# Patient Record
Sex: FEMALE | Race: BLACK OR AFRICAN AMERICAN | NOT HISPANIC OR LATINO | Employment: STUDENT | URBAN - METROPOLITAN AREA
[De-identification: names, ages, dates, MRNs, and addresses within clinical notes are randomized per-mention and may not be internally consistent; named-entity substitution may affect disease eponyms.]

---
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
## 2025-05-28 ENCOUNTER — APPOINTMENT (EMERGENCY)
Dept: RADIOLOGY | Facility: HOSPITAL | Age: 25
End: 2025-05-28
Payer: COMMERCIAL

## 2025-05-28 ENCOUNTER — HOSPITAL ENCOUNTER (EMERGENCY)
Facility: HOSPITAL | Age: 25
Discharge: HOME | End: 2025-05-28
Payer: COMMERCIAL

## 2025-05-28 VITALS
WEIGHT: 122 LBS | HEART RATE: 84 BPM | OXYGEN SATURATION: 100 % | DIASTOLIC BLOOD PRESSURE: 78 MMHG | TEMPERATURE: 98.8 F | SYSTOLIC BLOOD PRESSURE: 131 MMHG | RESPIRATION RATE: 16 BRPM

## 2025-05-28 DIAGNOSIS — S09.90XA HEAD INJURY: Primary | ICD-10-CM

## 2025-05-28 DIAGNOSIS — S16.1XXA STRAIN OF NECK MUSCLE, INITIAL ENCOUNTER: ICD-10-CM

## 2025-05-28 LAB
EXT PREGNANCY TEST URINE: NEGATIVE
EXT. CONTROL: NORMAL

## 2025-05-28 PROCEDURE — 72125 CT NECK SPINE W/O DYE: CPT

## 2025-05-28 PROCEDURE — 99284 EMERGENCY DEPT VISIT MOD MDM: CPT

## 2025-05-28 PROCEDURE — 81025 URINE PREGNANCY TEST: CPT | Performed by: PHYSICIAN ASSISTANT

## 2025-05-28 PROCEDURE — 70450 CT HEAD/BRAIN W/O DYE: CPT

## 2025-05-28 PROCEDURE — 99284 EMERGENCY DEPT VISIT MOD MDM: CPT | Performed by: PHYSICIAN ASSISTANT

## 2025-05-28 RX ORDER — FAMOTIDINE 40 MG/1
40 TABLET, FILM COATED ORAL DAILY
COMMUNITY

## 2025-05-28 NOTE — DISCHARGE INSTRUCTIONS
Follow up with work related medical provider for return to work   We have given name of [facility] for follow up  You are advised to rest. No phones, tv's, etc  Tylenol may be taken for symptoms  Return to ED for worsening headache or symptoms

## 2025-05-28 NOTE — Clinical Note
Norma Zaman was seen and treated in our emergency department on 5/28/2025.                Diagnosis:     Norma  .    She may return on this date:     Norma Zaman is excused from work through Thursday May 29th      If you have any questions or concerns, please don't hesitate to call.      Eliezer Chicas PA-C    ______________________________           _______________          _______________  Hospital Representative                              Date                                Time

## 2025-05-28 NOTE — ED PROVIDER NOTES
Time reflects when diagnosis was documented in both MDM as applicable and the Disposition within this note       Time User Action Codes Description Comment    5/28/2025 12:23 PM LaciesilviaEliezer Add [S09.90XA] Head injury     5/28/2025 12:23 PM LacieEliezer vega Add [S16.1XXA] Strain of neck muscle, initial encounter           ED Disposition       ED Disposition   Discharge    Condition   Stable    Date/Time   Wed May 28, 2025 12:23 PM    Comment   Norma Zaman discharge to home/self care.                   Assessment & Plan       Medical Decision Making  Differential dx includes intracranial injury, concussion, neck strain, neck fx  CT head shows no intracranial injury  CT c spine shows no acute fx  Ambulatory referral to [facility].   I advised rest until symptom free   Tylenol for headache  Return precautions reviewed.     Amount and/or Complexity of Data Reviewed  Labs: ordered.  Radiology: ordered.             Medications - No data to display    ED Risk Strat Scores                    No data recorded        SBIRT 22yo+      Flowsheet Row Most Recent Value   Initial Alcohol Screen: US AUDIT-C     1. How often do you have a drink containing alcohol? 0 Filed at: 05/28/2025 1045   2. How many drinks containing alcohol do you have on a typical day you are drinking?  0 Filed at: 05/28/2025 1045   3b. FEMALE Any Age, or MALE 65+: How often do you have 4 or more drinks on one occassion? 0 Filed at: 05/28/2025 1045   Audit-C Score 0 Filed at: 05/28/2025 1045   HANG: How many times in the past year have you...    Used an illegal drug or used a prescription medication for non-medical reasons? Never Filed at: 05/28/2025 1045                            History of Present Illness       Chief Complaint   Patient presents with    Head Injury     While at work around 0730 patient was lifting up a box (about 10 LB) which fell on her head. Presents with pounding headache, neck and jaw pain.       Past Medical  History[1]   Past Surgical History[2]   Family History[3]   Social History[4]   E-Cigarette/Vaping    E-Cigarette Use Current Every Day User       E-Cigarette/Vaping Substances    Nicotine No     THC No     CBD No     Flavoring No     Other No     Unknown No       I have reviewed and agree with the history as documented.     Patient is a 26 yo female who reports headache, worse when bending forward, nausea, R sided neck pain after 10 pound box fell on head at her job at [employer] 730 am this morning. H/o concussion x 1. No vomiting. No lethargy. No photophobia or phonophobia. No extremity numbness, tingling, weakness. No other complaints         Review of Systems   Respiratory:  Negative for shortness of breath.    Cardiovascular:  Negative for chest pain.   Gastrointestinal:  Positive for nausea. Negative for abdominal pain and vomiting.   Musculoskeletal:  Positive for neck pain. Negative for back pain.   Neurological:  Positive for headaches. Negative for dizziness and numbness.           Objective       ED Triage Vitals   Temperature Pulse Blood Pressure Respirations SpO2 Patient Position - Orthostatic VS   05/28/25 1042 05/28/25 1042 05/28/25 1042 05/28/25 1042 05/28/25 1042 05/28/25 1042   98.8 °F (37.1 °C) 84 131/78 16 100 % Sitting      Temp Source Heart Rate Source BP Location FiO2 (%) Pain Score    05/28/25 1042 05/28/25 1042 05/28/25 1042 -- 05/28/25 1052    Tympanic Monitor Right arm  5      Vitals      Date and Time Temp Pulse SpO2 Resp BP Pain Score FACES Pain Rating User   05/28/25 1052 -- -- -- -- -- 5 -- SF   05/28/25 1042 98.8 °F (37.1 °C) 84 100 % 16 131/78 -- -- OE            Physical Exam  Vitals and nursing note reviewed.   Constitutional:       General: She is not in acute distress.     Appearance: Normal appearance. She is not ill-appearing, toxic-appearing or diaphoretic.   HENT:      Head: Normocephalic and atraumatic.      Right Ear: Tympanic membrane, ear canal and external ear normal.       Left Ear: Tympanic membrane, ear canal and external ear normal.      Nose: Nose normal.      Mouth/Throat:      Mouth: Mucous membranes are moist.      Pharynx: Oropharynx is clear.     Eyes:      Extraocular Movements: Extraocular movements intact.      Pupils: Pupils are equal, round, and reactive to light.       Cardiovascular:      Rate and Rhythm: Normal rate and regular rhythm.      Pulses: Normal pulses.      Heart sounds: Normal heart sounds.   Pulmonary:      Effort: Pulmonary effort is normal.      Breath sounds: Normal breath sounds.   Abdominal:      General: Abdomen is flat.      Palpations: Abdomen is soft.     Musculoskeletal:         General: Normal range of motion.      Cervical back: Normal range of motion and neck supple. No tenderness.     Skin:     General: Skin is warm and dry.      Capillary Refill: Capillary refill takes less than 2 seconds.     Neurological:      General: No focal deficit present.      Mental Status: She is alert and oriented to person, place, and time.      Cranial Nerves: No cranial nerve deficit.      Sensory: No sensory deficit.         Results Reviewed       Procedure Component Value Units Date/Time    POCT pregnancy, urine [[id number]]  (Normal) Collected: 05/28/25 1112    Lab Status: Final result Updated: 05/28/25 1112     EXT Preg Test, Ur Negative     Control Valid            CT head without contrast   Final Interpretation by Richard Camacho MD (05/28 4893)      1.  No acute intracranial abnormality.   2.  No cervical spine fracture or traumatic malalignment                  Workstation performed: [id number]         CT spine cervical without contrast   Final Interpretation by Richard Camacho MD (05/28 0639)      1.  No acute intracranial abnormality.   2.  No cervical spine fracture or traumatic malalignment                  Workstation performed: [id number]             Procedures    ED Medication and Procedure Management   Prior to Admission Medications   Prescriptions  Last Dose Informant Patient Reported? Taking?   famotidine (PEPCID) 40 MG tablet   Yes Yes   Sig: Take 40 mg by mouth daily      Facility-Administered Medications: None     Discharge Medication List as of 5/28/2025 12:25 PM        CONTINUE these medications which have NOT CHANGED    Details   famotidine (PEPCID) 40 MG tablet Take 40 mg by mouth daily, Historical Med             ED SEPSIS DOCUMENTATION   Time reflects when diagnosis was documented in both MDM as applicable and the Disposition within this note       Time User Action Codes Description Comment    5/28/2025 12:23 PM Eliezer Chicas Add [S09.90XA] Head injury     5/28/2025 12:23 PM Eliezer Chicas [S16.1XXA] Strain of neck muscle, initial encounter                    [1] No past medical history on file.  [2] No past surgical history on file.  [3] No family history on file.  [4]   Social History  Tobacco Use    Smoking status: Never   Vaping Use    Vaping status: Every Day   Substance Use Topics    Alcohol use: Never    Drug use: Never        Eliezer Chicas PA-C  05/28/25 0112